# Patient Record
Sex: MALE | Race: WHITE | Employment: FULL TIME | URBAN - METROPOLITAN AREA
[De-identification: names, ages, dates, MRNs, and addresses within clinical notes are randomized per-mention and may not be internally consistent; named-entity substitution may affect disease eponyms.]

---

## 2018-07-26 ENCOUNTER — APPOINTMENT (OUTPATIENT)
Dept: GENERAL RADIOLOGY | Age: 43
End: 2018-07-26
Attending: EMERGENCY MEDICINE
Payer: COMMERCIAL

## 2018-07-26 ENCOUNTER — HOSPITAL ENCOUNTER (OUTPATIENT)
Age: 43
Discharge: HOME | End: 2018-07-26
Attending: EMERGENCY MEDICINE
Payer: COMMERCIAL

## 2018-07-26 VITALS
SYSTOLIC BLOOD PRESSURE: 121 MMHG | DIASTOLIC BLOOD PRESSURE: 79 MMHG | HEART RATE: 67 BPM | TEMPERATURE: 98 F | OXYGEN SATURATION: 97 % | RESPIRATION RATE: 18 BRPM

## 2018-07-26 DIAGNOSIS — S60.451A FOREIGN BODY OF LEFT INDEX FINGER: Primary | ICD-10-CM

## 2018-07-26 PROCEDURE — 99213 OFFICE O/P EST LOW 20 MIN: CPT

## 2018-07-26 PROCEDURE — 73140 X-RAY EXAM OF FINGER(S): CPT | Performed by: EMERGENCY MEDICINE

## 2018-07-26 PROCEDURE — 90471 IMMUNIZATION ADMIN: CPT

## 2018-07-26 NOTE — ED INITIAL ASSESSMENT (HPI)
Patient states he lacerated his left 2nd digit approximately 1 month ago with a . C/O difficulty flexing digit and pain.

## 2018-07-26 NOTE — ED PROVIDER NOTES
Patient presents with:  Wound    HPI:     Tawnya Kaminski is a 39year old male who presents with chief complaint of L index finger injury. 1 month ago pt injured to the L dorsal 2nd finger PIP with a . He states he washed it out, not sure of FB. finger due  to swelling. Patient has a small healing cut to posterior left 2nd finger over PIP joint. FINDINGS: No fracture or dislocation. Joint spaces are relatively well maintained. No bone abnormality.   IMPRESSION: Unremarkable radiographs of the lef

## 2018-08-14 ENCOUNTER — MYAURORA ACCOUNT LINK (OUTPATIENT)
Dept: OTHER | Age: 43
End: 2018-08-14

## 2018-08-14 ENCOUNTER — PRIOR ORIGINAL RECORDS (OUTPATIENT)
Dept: OTHER | Age: 43
End: 2018-08-14

## 2018-08-23 PROBLEM — M25.552 PAIN OF BOTH HIP JOINTS: Status: ACTIVE | Noted: 2018-08-23

## 2018-08-23 PROBLEM — M54.2 CHRONIC NECK PAIN: Status: ACTIVE | Noted: 2018-08-23

## 2018-08-23 PROBLEM — M25.551 PAIN OF BOTH HIP JOINTS: Status: ACTIVE | Noted: 2018-08-23

## 2018-08-23 PROBLEM — G89.29 CHRONIC BILATERAL LOW BACK PAIN WITHOUT SCIATICA: Status: ACTIVE | Noted: 2018-08-23

## 2018-08-23 PROBLEM — M54.50 CHRONIC BILATERAL LOW BACK PAIN WITHOUT SCIATICA: Status: ACTIVE | Noted: 2018-08-23

## 2018-08-23 PROBLEM — G89.29 CHRONIC NECK PAIN: Status: ACTIVE | Noted: 2018-08-23

## 2018-08-29 ENCOUNTER — PRIOR ORIGINAL RECORDS (OUTPATIENT)
Dept: OTHER | Age: 43
End: 2018-08-29

## 2018-08-30 ENCOUNTER — PRIOR ORIGINAL RECORDS (OUTPATIENT)
Dept: OTHER | Age: 43
End: 2018-08-30

## 2018-09-10 ENCOUNTER — HOSPITAL ENCOUNTER (OUTPATIENT)
Dept: CV DIAGNOSTICS | Facility: HOSPITAL | Age: 43
Discharge: HOME | End: 2018-09-10
Attending: INTERNAL MEDICINE
Payer: COMMERCIAL

## 2018-09-10 DIAGNOSIS — I48.19 ATRIAL FIBRILLATION, PERSISTENT (HCC): ICD-10-CM

## 2018-09-10 PROCEDURE — 93227 XTRNL ECG REC<48 HR R&I: CPT | Performed by: INTERNAL MEDICINE

## 2018-09-10 PROCEDURE — 93226 XTRNL ECG REC<48 HR SCAN A/R: CPT | Performed by: INTERNAL MEDICINE

## 2018-09-10 PROCEDURE — 93225 XTRNL ECG REC<48 HRS REC: CPT | Performed by: INTERNAL MEDICINE

## 2018-09-20 ENCOUNTER — PRIOR ORIGINAL RECORDS (OUTPATIENT)
Dept: OTHER | Age: 43
End: 2018-09-20

## 2019-02-28 VITALS
DIASTOLIC BLOOD PRESSURE: 66 MMHG | HEART RATE: 60 BPM | WEIGHT: 252 LBS | BODY MASS INDEX: 34.13 KG/M2 | SYSTOLIC BLOOD PRESSURE: 92 MMHG | HEIGHT: 72 IN

## 2019-08-05 RX ORDER — METOPROLOL SUCCINATE 50 MG/1
TABLET, EXTENDED RELEASE ORAL
Qty: 90 TABLET | Refills: 0 | Status: SHIPPED | OUTPATIENT
Start: 2019-08-05 | End: 2019-11-24 | Stop reason: SDUPTHER

## 2019-11-25 RX ORDER — METOPROLOL SUCCINATE 50 MG/1
50 TABLET, EXTENDED RELEASE ORAL DAILY
Qty: 90 TABLET | Refills: 0 | Status: SHIPPED | OUTPATIENT
Start: 2019-11-25